# Patient Record
Sex: MALE | Race: WHITE | NOT HISPANIC OR LATINO | URBAN - METROPOLITAN AREA
[De-identification: names, ages, dates, MRNs, and addresses within clinical notes are randomized per-mention and may not be internally consistent; named-entity substitution may affect disease eponyms.]

---

## 2021-02-08 ENCOUNTER — OFFICE (OUTPATIENT)
Dept: URBAN - METROPOLITAN AREA CLINIC 106 | Facility: CLINIC | Age: 41
End: 2021-02-08

## 2021-02-08 VITALS
HEART RATE: 87 BPM | HEIGHT: 70 IN | WEIGHT: 218 LBS | DIASTOLIC BLOOD PRESSURE: 78 MMHG | TEMPERATURE: 97.6 F | SYSTOLIC BLOOD PRESSURE: 120 MMHG

## 2021-02-08 DIAGNOSIS — Z83.71 FAMILY HISTORY OF COLONIC POLYPS: ICD-10-CM

## 2021-02-08 DIAGNOSIS — R19.4 CHANGE IN BOWEL HABIT: ICD-10-CM

## 2021-02-08 DIAGNOSIS — K92.1 MELENA: ICD-10-CM

## 2021-02-08 DIAGNOSIS — R10.30 LOWER ABDOMINAL PAIN, UNSPECIFIED: ICD-10-CM

## 2021-02-08 PROCEDURE — 99204 OFFICE O/P NEW MOD 45 MIN: CPT

## 2021-02-08 RX ORDER — SODIUM SULFATE, POTASSIUM SULFATE, MAGNESIUM SULFATE 17.5; 3.13; 1.6 G/ML; G/ML; G/ML
SOLUTION, CONCENTRATE ORAL
Qty: 1 | Refills: 0 | Status: COMPLETED
Start: 2021-02-08 | End: 2021-04-13

## 2021-02-08 NOTE — SERVICENOTES
Please send x-ray orders for cervical and lumbar spine to [facility].   Pt agrees with and states understanding of plan

## 2021-02-08 NOTE — SERVICEHPINOTES
Mark Cash   is seen for an initial visit today.     He presents today complaining of alternating bowel movements x 20 years. He states diarrhea mostly, usually after he eats approx. 5-6 times per day and constipation approx. every 13 days lasting for 4 days, at times he takes magnesium citrate to have a BM. He has noticed blood mixed in his stool approx. 1-2 times per month for 2 days over the past 4 years. He also states occasional nocturnal stooling. He has lower abdominal "stabbing" pain when he is constipated. He denies black, tarry stools or unintentional weight loss. He denies family history of colon CA or IBD, mom has a history of colon polyps.

## 2021-02-12 LAB — C DIFFICILE TOXIN GENE NAA: NEGATIVE

## 2021-04-02 ENCOUNTER — OFFICE (OUTPATIENT)
Dept: URBAN - METROPOLITAN AREA CLINIC 106 | Facility: CLINIC | Age: 41
End: 2021-04-02

## 2021-04-02 ENCOUNTER — AMBULATORY SURGICAL CENTER (OUTPATIENT)
Dept: URBAN - METROPOLITAN AREA SURGERY 25 | Facility: SURGERY | Age: 41
End: 2021-04-02

## 2021-04-02 DIAGNOSIS — K92.1 MELENA: ICD-10-CM

## 2021-04-02 DIAGNOSIS — D12.2 BENIGN NEOPLASM OF ASCENDING COLON: ICD-10-CM

## 2021-04-02 DIAGNOSIS — D12.3 BENIGN NEOPLASM OF TRANSVERSE COLON: ICD-10-CM

## 2021-04-02 DIAGNOSIS — K63.5 POLYP OF COLON: ICD-10-CM

## 2021-04-02 DIAGNOSIS — D12.0 BENIGN NEOPLASM OF CECUM: ICD-10-CM

## 2021-04-02 DIAGNOSIS — K64.8 OTHER HEMORRHOIDS: ICD-10-CM

## 2021-04-02 DIAGNOSIS — K57.30 DIVERTICULOSIS OF LARGE INTESTINE WITHOUT PERFORATION OR ABS: ICD-10-CM

## 2021-04-02 LAB
COLONOSCOPY STUDY: (no result)
COLONOSCOPY STUDY: (no result)

## 2021-04-02 PROCEDURE — 45380 COLONOSCOPY AND BIOPSY: CPT | Performed by: INTERNAL MEDICINE

## 2021-04-02 PROCEDURE — 88305 TISSUE EXAM BY PATHOLOGIST: CPT

## 2021-04-13 ENCOUNTER — OFFICE (OUTPATIENT)
Dept: URBAN - METROPOLITAN AREA CLINIC 106 | Facility: CLINIC | Age: 41
End: 2021-04-13

## 2021-04-13 VITALS
WEIGHT: 222 LBS | SYSTOLIC BLOOD PRESSURE: 122 MMHG | TEMPERATURE: 97.1 F | HEIGHT: 70 IN | HEART RATE: 80 BPM | DIASTOLIC BLOOD PRESSURE: 66 MMHG

## 2021-04-13 DIAGNOSIS — R19.7 DIARRHEA, UNSPECIFIED: ICD-10-CM

## 2021-04-13 DIAGNOSIS — Z83.71 FAMILY HISTORY OF COLONIC POLYPS: ICD-10-CM

## 2021-04-13 DIAGNOSIS — Z86.010 PERSONAL HISTORY OF COLONIC POLYPS: ICD-10-CM

## 2021-04-13 DIAGNOSIS — K59.09 OTHER CONSTIPATION: ICD-10-CM

## 2021-04-13 PROCEDURE — 99213 OFFICE O/P EST LOW 20 MIN: CPT

## 2021-04-13 NOTE — SERVICEHPINOTES
Mark Cash   is seen today for a follow-up visit.     Mark Cash is seen for an initial visit today. He presents today complaining of alternating bowel movements x 20 years. He states diarrhea mostly, usually after he eats approx. 5-6 times per day and constipation approx. every 13 days lasting for 4 days, at times he takes magnesium citrate to have a BM. He has noticed blood mixed in his stool approx. 1-2 times per month for 2 days over the past 4 years. He also states occasional nocturnal stooling. He has lower abdominal "stabbing" pain when he is constipated. He denies black, tarry stools or unintentional weight loss. He denies family history of colon CA or IBD, mom has a history of colon polyps. Today, he states he is still have same symptoms as before colonoscopy, diarrhea 4-5 times per day and constipation 1-2 times per month which he takes a bottle of magnesium citrate for. He has started a high fiber diet and is taking daily fiber supplements. BR